# Patient Record
(demographics unavailable — no encounter records)

---

## 2025-01-03 NOTE — ASSESSMENT
[FreeTextEntry1] : xray of finger if pain persisting  no heavy lifting  uri likely viral hold any antibiotic - supportive care - hydrate and rest , start flonase - if not improved or worse start azithromycin in 3-4 days  schedule cpe

## 2025-01-03 NOTE — HISTORY OF PRESENT ILLNESS
[FreeTextEntry1] : *** 01/03/2025  *** JUAN ANTONIO gets seizures once a day, usually in the evening. Starts with left arm/hand starting with sende of tingling followed by stretching out of the hand and may raise the arm.  Will last 8-10 seconds.  Does not feel that he has any impairment, would be able to answer, but would not have full attention - but may be "a little disoriented".  Rarely may feel that right arm is involved. Does not awaken with tongue bite. Longest without medication - may miss morning dose - seizure will come earlier and stronger. Last MRI >3 years ago.  Has had EEGs - including EMU eval - and even when seizure occurs, EEG not showing change. Does not think he's had PET scan.  Has not changed medications for a long time.    PMH - no other medical problems MEds - vitamins  All - NKDA  Social - no tobacco, no alcohol, works as a .   FH - no history of seizures (did genetic test that reported maternal finding) ROS - none   *** 12/28/2022 from Dr. Feng's note ***  This 22-year-old man was seen in consultation today, accompanied by his mother. He has had seizures since age 8, followed at [facility] in the pediatric neurology now seeking to switch to adult neurology.  The only generalized convulsive seizure he had was at age 8.  What he is getting are focal seizures. Describes these as his left hand and arm stretching for a few seconds. Happens about twice a week. There is no alteration of consciousness.  He has tried multiple medications in the past but brings no list or records with him. Currently on Onfi 10/30 mg and Oxtellar 600/1200 mg.  Says this combination has given him the best control. He is quite pleased with the way he is doing.  He graduated "[employer] in music and currently is a  in the schools. He does drive Reportedly prior MRIs have been negative Mother says EEGs have been unrevealing as well His medical history is otherwise unremarkable  Orientation: oriented to person, oriented to place and oriented to time.   Attention: normal concentrating ability.   Language: fluency intact and comprehension intact.   Cranial Nerves: visual acuity intact bilaterally, visual fields full to confrontation, pupils equal round and reactive to light, extraocular motion intact, facial sensation intact symmetrically, face symmetrical, hearing was intact bilaterally, tongue and palate midline, head turning and shoulder shrug symmetric and there was no tongue deviation with protrusion.   Motor: muscle tone was normal in all four extremities; muscle strength was normal in all four extremities and normal bulk in all four extremities.   Motor Strength: no pronator drift on the right. no pronator drift on the left.   Sensory exam: light touch was intact; pain and temperature were intact, and proprioception was intact. Romberg's sign was negative.   Coordination: normal gait. balance was intact. there was no past-pointing. no tremor present. Finger to nose dysmetria was not present. Heel-shin dysmetria was not present.   Deep tendon reflexes: Biceps right 2+. Biceps left 2+.  Triceps right 2+. Triceps left 2+.  Brachioradialis right 2+. Brachioradialis left 2+.  Patella right 2+. Patella left 2+.  Ankle jerk right 2+. Ankle jerk left 2+.   Plantar responses normal on the right, normal on the left.   Eyes: pupils were equal in size, round, reactive to light, with normal accommodation, extraocular movements were intact and full visual field.  *** 5/7/2021 *** Pt seen at [facility] by Dr Gonzalez Lema, prior to this seen at [place]. Sz onset age 8 on rx x 2 yrs, then stopped then first/only GTCS 2 mo later.  Brief focal seizures, left hand tingling, stiffening, tonic extension. x 10sec. 5x/week avg. usually at night when relaxed. no wakening with these as of late. OXC, LEV (no efficacy), now oxtellar 600/1200mg and onfi 10/30mg. Better control for last 4-5years. No ADRs, some fatigue.  Mood, cognition stable. Student at "[employer]", [employer] major. Told prior EEGs normal.  2/2021 MRI/fMRI: report nl. L brain language, motor areas typical 12/2019 PET nl

## 2025-01-03 NOTE — ASSESSMENT
[FreeTextEntry1] : RODRÍGUEZ is a 25yo M with h/o seizures since 9yo.  Seizures characterized by paresthesia in LUE progressing to hand stiffness and arm elevation with mild impairment of awareness.  Relatively stable on combination of Oxtellar 600/1200 and clobazam 10/30, but having frequent seizures - 5+ times per week.   Plan 1. repeat MRI head with epilepsy protocol - will try to have study done on new magnet at [facility] 2. continue current ASM - I spoke to Rodríguez and his mother about trial of cenobamate. He will investigate and we will discuss again at future appointments.  3. f/u visit in 3 mo.   I have spent 60 minutes or longer reviewing patient data or discussing with the patient the cause of seizures or seizure-like events and comorbid conditions, assessing the risk of recurrence, educating the patient or family to recognize seizures, discussing possible treatment options for seizures and comorbid conditions and documenting encounter and plan. More than 50% of time spent counseling and educating patient about epilepsy including safety issues, AED side effects and interactions, alcohol consumption, sleep deprivation, risks and driving privileges associated with the [facility].

## 2025-01-03 NOTE — PHYSICAL EXAM
[No Acute Distress] : no acute distress [Well Nourished] : well nourished [Well Developed] : well developed [Well-Appearing] : well-appearing [Normal Voice/Communication] : normal voice/communication [Normal Sclera/Conjunctiva] : normal sclera/conjunctiva [PERRL] : pupils equal round and reactive to light [Normal Outer Ear/Nose] : the outer ears and nose were normal in appearance [No JVD] : no jugular venous distention [No Lymphadenopathy] : no lymphadenopathy [Supple] : supple [No Respiratory Distress] : no respiratory distress  [No Accessory Muscle Use] : no accessory muscle use [Clear to Auscultation] : lungs were clear to auscultation bilaterally [Normal Rate] : normal rate  [Regular Rhythm] : with a regular rhythm [Normal S1, S2] : normal S1 and S2 [No Murmur] : no murmur heard [No Edema] : there was no peripheral edema [No Extremity Clubbing/Cyanosis] : no extremity clubbing/cyanosis [Soft] : abdomen soft [Non Tender] : non-tender [Non-distended] : non-distended [No Masses] : no abdominal mass palpated [No HSM] : no HSM [Normal Bowel Sounds] : normal bowel sounds [Normal Posterior Cervical Nodes] : no posterior cervical lymphadenopathy [Normal Anterior Cervical Nodes] : no anterior cervical lymphadenopathy [No CVA Tenderness] : no CVA  tenderness [No Spinal Tenderness] : no spinal tenderness [No Joint Swelling] : no joint swelling [Grossly Normal Strength/Tone] : grossly normal strength/tone [No Rash] : no rash [Coordination Grossly Intact] : coordination grossly intact [No Focal Deficits] : no focal deficits [Normal Gait] : normal gait [Speech Grossly Normal] : speech grossly normal [Normal Affect] : the affect was normal [Alert and Oriented x3] : oriented to person, place, and time [Normal Mood] : the mood was normal [Normal Insight/Judgement] : insight and judgment were intact [de-identified] : mild erythema of post pharynx with cobblestoning, no exudate, erythema  of nasal mucosa, TM mild post ear effusion, no erythema of tm [de-identified] : pain with full flexion of 5th digit right hand , nontender , no swelling

## 2025-01-03 NOTE — REVIEW OF SYSTEMS
[Night Sweats] : night sweats [Postnasal Drip] : postnasal drip [Nasal Discharge] : nasal discharge [Cough] : cough [Negative] : Heme/Lymph

## 2025-01-03 NOTE — HISTORY OF PRESENT ILLNESS
[FreeTextEntry8] : right pinky finger injury playing sport  2 weeks ago volleyball injury - , rest , ice , now rated movement 6/10 , now better but extreme flexion and pressure make it worse .  admits to congestion and some night sweats and coughing for past few days  no fever during day , no sore throat ,no ear pain -

## 2025-01-22 NOTE — ASSESSMENT
[FreeTextEntry1] : JUAN ANTONIO is a 25yo M with h/o seizures since 9yo.  Seizures characterized by paresthesia in LUE progressing to hand stiffness and arm elevation with mild impairment of awareness.  Relatively stable on combination of Oxtellar 600/1200 and clobazam 10/30, but having frequent seizures - 5+ times per week.   Plan 1. repeat MRI head with epilepsy protocol - will try to have study done on new magnet at [facility] 2. start cenobamate/xcopri; will ask THADDEUS Castro to f/u with JUAN ANTONIO  3. f/u visit in 3 mo.   I have spent 60 minutes or longer reviewing patient data or discussing with the patient the cause of seizures or seizure-like events and comorbid conditions, assessing the risk of recurrence, educating the patient or family to recognize seizures, discussing possible treatment options for seizures and comorbid conditions and documenting encounter and plan. More than 50% of time spent counseling and educating patient about epilepsy including safety issues, AED side effects and interactions, alcohol consumption, sleep deprivation, risks and driving privileges associated with the [facility].

## 2025-01-22 NOTE — ASSESSMENT
[FreeTextEntry1] : JUAN ANTONIO is a 23yo M with h/o seizures since 7yo.  Seizures characterized by paresthesia in LUE progressing to hand stiffness and arm elevation with mild impairment of awareness.  Relatively stable on combination of Oxtellar 600/1200 and clobazam 10/30, but having frequent seizures - 5+ times per week.   Plan 1. repeat MRI head with epilepsy protocol - will try to have study done on new magnet at [facility] 2. start cenobamate/xcopri; will ask THADDEUS Castro to f/u with JUAN ANTONIO  3. f/u visit in 3 mo.   I have spent 60 minutes or longer reviewing patient data or discussing with the patient the cause of seizures or seizure-like events and comorbid conditions, assessing the risk of recurrence, educating the patient or family to recognize seizures, discussing possible treatment options for seizures and comorbid conditions and documenting encounter and plan. More than 50% of time spent counseling and educating patient about epilepsy including safety issues, AED side effects and interactions, alcohol consumption, sleep deprivation, risks and driving privileges associated with the [facility].

## 2025-01-22 NOTE — HISTORY OF PRESENT ILLNESS
[FreeTextEntry1] : *** 01/16/2025  *** JUAN ANTONIO reports having multiple seizures - seven total - up to 3 in an hour - after last visit - unusual day.  JUAN ANTONIO thinks he was overstressed, but no sleep deprived. However, he made sure to get more sleep. In past seizures, usually happen in the evening when already in bed.  After that day, things have settled down.  He agrees with starting Xcopri.    *** 01/03/2025  *** JUAN ANTONIO gets seizures once a day, usually in the evening. Starts with left arm/hand starting with sende of tingling followed by stretching out of the hand and may raise the arm.  Will last 8-10 seconds.  Does not feel that he has any impairment, would be able to answer, but would not have full attention - but may be "a little disoriented".  Rarely may feel that right arm is involved. Does not awaken with tongue bite. Longest without medication - may miss morning dose - seizure will come earlier and stronger. Last MRI >3 years ago.  Has had EEGs - including EMU eval - and even when seizure occurs, EEG not showing change. Does not think he's had PET scan.  Has not changed medications for a long time.    PMH - no other medical problems MEds - vitamins  All - NKDA  Social - no tobacco, no alcohol, works as a .   FH - no history of seizures (did genetic test that reported maternal finding) ROS - none

## 2025-02-05 NOTE — HEALTH RISK ASSESSMENT
[Good] : ~his/her~ current health as good [No] : In the past 12 months have you used drugs other than those required for medical reasons? No [Little interest or pleasure doing things] : 1) Little interest or pleasure doing things [Feeling down, depressed, or hopeless] : 2) Feeling down, depressed, or hopeless [0] : 2) Feeling down, depressed, or hopeless: Not at all (0) [PHQ-2 Negative - No further assessment needed] : PHQ-2 Negative - No further assessment needed [Never] : Never [None] : None [With Family] : lives with family [Employed] : employed [College] : College [Single] : single [Sexually Active] : sexually active [Feels Safe at Home] : Feels safe at home [Fully functional (bathing, dressing, toileting, transferring, walking, feeding)] : Fully functional (bathing, dressing, toileting, transferring, walking, feeding) [Fully functional (using the telephone, shopping, preparing meals, housekeeping, doing laundry, using] : Fully functional and needs no help or supervision to perform IADLs (using the telephone, shopping, preparing meals, housekeeping, doing laundry, using transportation, managing medications and managing finances) [[id number]] : 0 [Reports changes in hearing] : Reports no changes in hearing [Reports changes in vision] : Reports no changes in vision [Reports changes in dental health] : Reports no changes in dental health

## 2025-02-05 NOTE — PHYSICAL EXAM
[No Acute Distress] : no acute distress [Well Nourished] : well nourished [Well Developed] : well developed [Well-Appearing] : well-appearing [Normal Voice/Communication] : normal voice/communication [Normal Sclera/Conjunctiva] : normal sclera/conjunctiva [PERRL] : pupils equal round and reactive to light [EOMI] : extraocular movements intact [Normal Outer Ear/Nose] : the outer ears and nose were normal in appearance [Normal Oropharynx] : the oropharynx was normal [Normal TMs] : both tympanic membranes were normal [No JVD] : no jugular venous distention [No Lymphadenopathy] : no lymphadenopathy [Supple] : supple [Thyroid Normal, No Nodules] : the thyroid was normal and there were no nodules present [No Respiratory Distress] : no respiratory distress  [No Accessory Muscle Use] : no accessory muscle use [Clear to Auscultation] : lungs were clear to auscultation bilaterally [Normal Rate] : normal rate  [Regular Rhythm] : with a regular rhythm [Normal S1, S2] : normal S1 and S2 [No Murmur] : no murmur heard [No Carotid Bruits] : no carotid bruits [No Abdominal Bruit] : a ~M bruit was not heard ~T in the abdomen [No Varicosities] : no varicosities [Pedal Pulses Present] : the pedal pulses are present [No Edema] : there was no peripheral edema [No Palpable Aorta] : no palpable aorta [No Extremity Clubbing/Cyanosis] : no extremity clubbing/cyanosis [Soft] : abdomen soft [Non Tender] : non-tender [Non-distended] : non-distended [No Masses] : no abdominal mass palpated [No HSM] : no HSM [Normal Bowel Sounds] : normal bowel sounds [Normal Anterior Cervical Nodes] : no anterior cervical lymphadenopathy [Normal Posterior Cervical Nodes] : no posterior cervical lymphadenopathy [No CVA Tenderness] : no CVA  tenderness [No Spinal Tenderness] : no spinal tenderness [No Joint Swelling] : no joint swelling [Grossly Normal Strength/Tone] : grossly normal strength/tone [No Rash] : no rash [Coordination Grossly Intact] : coordination grossly intact [No Focal Deficits] : no focal deficits [Normal Gait] : normal gait [Speech Grossly Normal] : speech grossly normal [Normal Affect] : the affect was normal [Alert and Oriented x3] : oriented to person, place, and time [Normal Mood] : the mood was normal [Normal Insight/Judgement] : insight and judgment were intact

## 2025-02-05 NOTE — HISTORY OF PRESENT ILLNESS
[FreeTextEntry1] : cpe [de-identified] : no chest pain, no sob, no cough, no fever, no dizziness, no abdominal pain, no n/v/d/c/melena/brbpr/hematuria/dysuria

## 2025-02-05 NOTE — HEALTH RISK ASSESSMENT
[Good] : ~his/her~  mood as  good [No] : In the past 12 months have you used drugs other than those required for medical reasons? No [Little interest or pleasure doing things] : 1) Little interest or pleasure doing things [Feeling down, depressed, or hopeless] : 2) Feeling down, depressed, or hopeless [0] : 2) Feeling down, depressed, or hopeless: Not at all (0) [PHQ-2 Negative - No further assessment needed] : PHQ-2 Negative - No further assessment needed [Never] : Never [None] : None [With Family] : lives with family [Employed] : employed [College] : College [Single] : single [Sexually Active] : sexually active [Feels Safe at Home] : Feels safe at home [Fully functional (bathing, dressing, toileting, transferring, walking, feeding)] : Fully functional (bathing, dressing, toileting, transferring, walking, feeding) [Fully functional (using the telephone, shopping, preparing meals, housekeeping, doing laundry, using] : Fully functional and needs no help or supervision to perform IADLs (using the telephone, shopping, preparing meals, housekeeping, doing laundry, using transportation, managing medications and managing finances) [[id number]] : 0 [Reports changes in hearing] : Reports no changes in hearing [Reports changes in vision] : Reports no changes in vision [Reports changes in dental health] : Reports no changes in dental health

## 2025-02-05 NOTE — HISTORY OF PRESENT ILLNESS
[FreeTextEntry1] : cpe [de-identified] : no chest pain, no sob, no cough, no fever, no dizziness, no abdominal pain, no n/v/d/c/melena/brbpr/hematuria/dysuria

## 2025-04-03 NOTE — PHYSICAL EXAM
[General Appearance - Alert] : alert [General Appearance - In No Acute Distress] : in no acute distress [General Appearance - Well Nourished] : well nourished [General Appearance - Well Developed] : well developed [Sclera] : the sclera and conjunctiva were normal [PERRL With Normal Accommodation] : pupils were equal in size, round, and reactive to light [Extraocular Movements] : extraocular movements were intact [Outer Ear] : the ears and nose were normal in appearance [Neck Appearance] : the appearance of the neck was normal [Jugular Venous Distention Increased] : there was no jugular-venous distention [] : no respiratory distress [Respiration, Rhythm And Depth] : normal respiratory rhythm and effort [Exaggerated Use Of Accessory Muscles For Inspiration] : no accessory muscle use [Auscultation Breath Sounds / Voice Sounds] : lungs were clear to auscultation bilaterally [Heart Sounds] : normal S1 and S2 [Arterial Pulses Carotid] : carotid pulses were normal with no bruits [Edema] : there was no peripheral edema [Bowel Sounds] : normal bowel sounds [Abdomen Soft] : soft [Abdomen Tenderness] : non-tender [No CVA Tenderness] : no ~M costovertebral angle tenderness [Abnormal Walk] : normal gait [Skin Color & Pigmentation] : normal skin color and pigmentation [Skin Turgor] : normal skin turgor [No Focal Deficits] : no focal deficits [Oriented To Time, Place, And Person] : oriented to person, place, and time [Impaired Insight] : insight and judgment were intact [Affect] : the affect was normal [Mood] : the mood was normal

## 2025-04-11 NOTE — HISTORY OF PRESENT ILLNESS
[FreeTextEntry1] : *** 04/11/2025  *** JUAN ANTONIO is currently taking cenobamate 200 qHS, clobazam 10/15, Oxtellar  q12.  He reports that he has not had seizures in last week - does not recall last seizure, not from [facility]. Gloria notes last seizures occurred in week prior to starting cenobamate 200mg.  clobazam lowered from 10/20 to 10/15 about a week ago. Mother notes that JUAN ANTONIO is dozing off, yesterday he lost his balance after getting up from dozing. Hyponatremia resolved since lower Oxtellar XR from 1500/d to 1200/d.  MRI reviewed - noted frontal lobe differential volume in 2%-ile, but no obvious asymmetry on visual review.   *** 01/16/2025  *** JUAN ANTONIO reports having multiple seizures - seven total - up to 3 in an hour - after last visit - unusual day.  JUAN ANTONIO thinks he was overstressed, but no sleep deprived. However, he made sure to get more sleep. In past seizures, usually happen in the evening when already in bed.  After that day, things have settled down.  He agrees with starting Xcopri.    *** 01/03/2025  *** JUAN ANTONIO gets seizures once a day, usually in the evening. Starts with left arm/hand starting with sende of tingling followed by stretching out of the hand and may raise the arm.  Will last 8-10 seconds.  Does not feel that he has any impairment, would be able to answer, but would not have full attention - but may be "a little disoriented".  Rarely may feel that right arm is involved. Does not awaken with tongue bite. Longest without medication - may miss morning dose - seizure will come earlier and stronger. Last MRI >3 years ago.  Has had EEGs - including EMU eval - and even when seizure occurs, EEG not showing change. Does not think he's had PET scan.  Has not changed medications for a long time.    PMH - no other medical problems MEds - vitamins  All - NKDA  Social - no tobacco, no alcohol, works as a .   FH - no history of seizures (did genetic test that reported maternal finding) ROS - none

## 2025-04-11 NOTE — ASSESSMENT
[FreeTextEntry1] : JUAN ANTONIO is a 23yo M with h/o seizures since 9yo.  Seizures characterized by paresthesia in LUE progressing to hand stiffness and arm elevation with mild impairment of awareness.  Relatively stable on combination of Oxtellar 600/1200 and clobazam 10/30, but having frequent seizures - 5+ times per week.  Improved on addition of cenobamate - currently taking 200mg x 10 days, but c/o increased sleepiness.   Plan 1. reduce clobazam 20 qHS; continue Oxtellar  q12 for now - we should focus on slowly withdrawing clobazam as this is greater source of side effects. 2. increase cenobamate as needed for seizure control - currently 200mg qHS - will increase 250mg in response to recurrent focal seizure.  3. clinical pharmacist following closely to recommend ASM adjustments in response to AE and seizure recurrence.  3. f/u visit in 3 mo.   I have spent 40 minutes or longer reviewing patient data or discussing with the patient the cause of seizures or seizure-like events and comorbid conditions, assessing the risk of recurrence, educating the patient or family to recognize seizures, discussing possible treatment options for seizures and comorbid conditions and documenting encounter and plan. More than 50% of time spent counseling and educating patient about epilepsy including safety issues, AED side effects and interactions, alcohol consumption, sleep deprivation, risks and driving privileges associated with the [facility].

## 2025-04-21 NOTE — ASSESSMENT
[FreeTextEntry1] : follow up labs  brat diet  benign abdomen  if continued diarrhea recommend stool study

## 2025-04-21 NOTE — HISTORY OF PRESENT ILLNESS
[Parent] : parent [FreeTextEntry8] :  travel to [place], Friday started with diarrhea, that day had coffee and a breakfast sandwich was nauseous couldn't eat it. [employer] and in and out on that trip. vomiting started Saturday - symptoms resolved on Sunday - only now experiencing dizziness and fatigue.  no abdominal, no fever, no solid BM since. no fever, no melena, no brbpr,

## 2025-04-21 NOTE — HISTORY OF PRESENT ILLNESS
[Parent] : parent [FreeTextEntry8] :  travel to [place], Friday started with diarrhea, that day had coffee and a breakfast sandwich was nauseous couldn't eat it. [employer] and in and out on that trip. vomiting started Saturday - symptoms resolved on Sunday - only now experiencing dizziness and fatigue.  no abdominal, no fever, no solid BM since. no fever, no melena, no brbpr,

## 2025-05-13 NOTE — ASSESSMENT
[FreeTextEntry1] : JUAN ANTONIO is a 23yo M with h/o seizures since 7yo.  Seizures characterized by paresthesia in LUE progressing to hand stiffness and arm elevation with mild impairment of awareness.  Relatively stable on combination of Oxtellar 600/1200 and clobazam 10/30, but having frequent seizures - 5+ times per week. Now seizure free on cenobamate 200 qHS, along with clobazam 5 qHS and oxcarbazepine  q12 - but experiencing unacceptable fatigue and sleepiness - now mostly in evening - improvement over prior.   Plan: 1. continue clobazam 5 qHS x 1 week then DC - will ask clinical pharmacist to f/u with patient.  2. continue cenobamate 200 qHS and oxcarbazepine  q12 for now 3. we discussed that there may be benefit in taking cenobamate dose later - 9p or 9:30 to delay onset of sleepiness.  4. f/u visit in 6 wks.    I have spent 40 minutes or longer reviewing patient data or discussing with the patient the cause of seizures or seizure-like events and comorbid conditions, assessing the risk of recurrence, educating the patient or family to recognize seizures, discussing possible treatment options for seizures and comorbid conditions and documenting encounter and plan. More than 50% of time spent counseling and educating patient about epilepsy including safety issues, AED side effects and interactions, alcohol consumption, sleep deprivation, risks and driving privileges associated with the [facility].

## 2025-05-13 NOTE — REASON FOR VISIT
[Mother] : mother [Home] : at home, [unfilled] , at the time of the visit. [Medical Office: ([facility])___] : at the medical office located in  [Telehealth (audio & video)] : This visit was provided via telehealth using real-time 2-way audio visual technology. [Verbal consent obtained from patient] : the patient, [unfilled] [Follow-Up: _____] : a [unfilled] follow-up visit [Parent] : parent

## 2025-05-13 NOTE — HISTORY OF PRESENT ILLNESS
[FreeTextEntry1] : *** 05/13/2025  *** JUAN ANTONIO reports that he is not having focal seizures (used to occur every night as he was falling asleep).  He continues to feel more tired than usual, but this has improved and mostly affects him in the evening.  He has been working with clinical pharmacist to reduce clobazam as cenobamate dose increases. He started clobazam 5mg qHS about a week ago, and notes improvement in fatigue.  Hyponatremia has resolved on most recent labs. JUAN ANTONIO takes medications about 8p and 8a. Extreme tiredness hits about 10:30p - to point he needs assistance getting to bed.  ASM: cenobamate 200 qHS clobazam 5qHS oxcarbazepine  q12  *** 04/11/2025  *** JUAN ANTONIO is currently taking cenobamate 200 qHS, clobazam 10/15, Oxtellar  q12.  He reports that he has not had seizures in last week - does not recall last seizure, not from [facility]. Gloria notes last seizures occurred in week prior to starting cenobamate 200mg.  clobazam lowered from 10/20 to 10/15 about a week ago. Mother notes that JUAN ANTONIO is dozing off, yesterday he lost his balance after getting up from dozing. Hyponatremia resolved since lower Oxtellar XR from 1500/d to 1200/d.  MRI reviewed - noted frontal lobe differential volume in 2%-ile, but no obvious asymmetry on visual review.   *** 01/16/2025  *** JUAN ANTONIO reports having multiple seizures - seven total - up to 3 in an hour - after last visit - unusual day.  JUAN ANTONIO thinks he was overstressed, but no sleep deprived. However, he made sure to get more sleep. In past seizures, usually happen in the evening when already in bed.  After that day, things have settled down.  He agrees with starting Xcopri.    *** 01/03/2025  *** JUAN ANTONIO gets seizures once a day, usually in the evening. Starts with left arm/hand starting with sende of tingling followed by stretching out of the hand and may raise the arm.  Will last 8-10 seconds.  Does not feel that he has any impairment, would be able to answer, but would not have full attention - but may be "a little disoriented".  Rarely may feel that right arm is involved. Does not awaken with tongue bite. Longest without medication - may miss morning dose - seizure will come earlier and stronger. Last MRI >3 years ago.  Has had EEGs - including EMU eval - and even when seizure occurs, EEG not showing change. Does not think he's had PET scan.  Has not changed medications for a long time.    PMH - no other medical problems MEds - vitamins  All - NKDA  Social - no tobacco, no alcohol, works as a .   FH - no history of seizures (did genetic test that reported maternal finding) ROS - none

## 2025-05-13 NOTE — PHYSICAL EXAM
[FreeTextEntry1] : alert and oriented x 3, speech fluent, names easily, follows requests, good recall for recent and remote events. EOM full without sustained nystagmus, PERRL, intact LT V1-V3 bilaterally, face symmetrical, no dysarthria, tongue midline, normal shoulder elevation. Motor - normal motion in upper extremities bilaterally. normal rapid-alternating movements, no drift Sensory - intact LT x 4 ext Coord - no tremor, ataxia Gait - stands without difficulty

## 2025-06-19 NOTE — PHYSICAL EXAM
[No Acute Distress] : no acute distress [Well Nourished] : well nourished [Well Developed] : well developed [Well-Appearing] : well-appearing [Normal Voice/Communication] : normal voice/communication [Normal Sclera/Conjunctiva] : normal sclera/conjunctiva [PERRL] : pupils equal round and reactive to light [EOMI] : extraocular movements intact [Normal Outer Ear/Nose] : the outer ears and nose were normal in appearance [No JVD] : no jugular venous distention [No Respiratory Distress] : no respiratory distress  [No Accessory Muscle Use] : no accessory muscle use [Clear to Auscultation] : lungs were clear to auscultation bilaterally [Normal Rate] : normal rate  [Regular Rhythm] : with a regular rhythm [Normal S1, S2] : normal S1 and S2 [No Murmur] : no murmur heard [No Edema] : there was no peripheral edema [No Nipple Discharge] : no nipple discharge [No Axillary Lymphadenopathy] : no axillary lymphadenopathy [Soft] : abdomen soft [Non Tender] : non-tender [Non-distended] : non-distended [No Masses] : no abdominal mass palpated [No HSM] : no HSM [Normal Bowel Sounds] : normal bowel sounds [No Spinal Tenderness] : no spinal tenderness [No Joint Swelling] : no joint swelling [Grossly Normal Strength/Tone] : grossly normal strength/tone [No Rash] : no rash [Coordination Grossly Intact] : coordination grossly intact [No Focal Deficits] : no focal deficits [Normal Gait] : normal gait [Speech Grossly Normal] : speech grossly normal [Normal Affect] : the affect was normal [Alert and Oriented x3] : oriented to person, place, and time [Normal Mood] : the mood was normal [Normal Insight/Judgement] : insight and judgment were intact [de-identified] : lump posterior to nipple bilateral - tender to palpation

## 2025-06-19 NOTE — PHYSICAL EXAM
[No Acute Distress] : no acute distress [Well Nourished] : well nourished [Well Developed] : well developed [Well-Appearing] : well-appearing [Normal Voice/Communication] : normal voice/communication [Normal Sclera/Conjunctiva] : normal sclera/conjunctiva [PERRL] : pupils equal round and reactive to light [EOMI] : extraocular movements intact [Normal Outer Ear/Nose] : the outer ears and nose were normal in appearance [No JVD] : no jugular venous distention [No Respiratory Distress] : no respiratory distress  [No Accessory Muscle Use] : no accessory muscle use [Clear to Auscultation] : lungs were clear to auscultation bilaterally [Normal Rate] : normal rate  [Regular Rhythm] : with a regular rhythm [Normal S1, S2] : normal S1 and S2 [No Murmur] : no murmur heard [No Edema] : there was no peripheral edema [No Nipple Discharge] : no nipple discharge [No Axillary Lymphadenopathy] : no axillary lymphadenopathy [Soft] : abdomen soft [Non Tender] : non-tender [Non-distended] : non-distended [No Masses] : no abdominal mass palpated [No HSM] : no HSM [Normal Bowel Sounds] : normal bowel sounds [No Spinal Tenderness] : no spinal tenderness [No Joint Swelling] : no joint swelling [Grossly Normal Strength/Tone] : grossly normal strength/tone [No Rash] : no rash [Coordination Grossly Intact] : coordination grossly intact [No Focal Deficits] : no focal deficits [Normal Gait] : normal gait [Speech Grossly Normal] : speech grossly normal [Normal Affect] : the affect was normal [Alert and Oriented x3] : oriented to person, place, and time [Normal Mood] : the mood was normal [Normal Insight/Judgement] : insight and judgment were intact [de-identified] : lump posterior to nipple bilateral - tender to palpation

## 2025-06-19 NOTE — HISTORY OF PRESENT ILLNESS
[FreeTextEntry8] : Lump on chest  admits to noticed with lifting was getting breast and chest tenderness - states area feels swollen and tender -

## 2025-07-07 NOTE — ASSESSMENT
[FreeTextEntry1] : JUAN ANTONIO is a 25yo M with h/o seizures since 9yo.  Seizures characterized by paresthesia in LUE progressing to hand stiffness and arm elevation with mild impairment of awareness.  Relatively stable on combination of Oxtellar 300 q12 and Xcopri, but recently had 5 seizures in past week - close to baseline seizure frequency. Seizure described as beginning with sensory aura of paresthesias.   Plan 1. MRI re-reviewed - normal study - if seizures recur, will request PET to determine is focal abnormality in the right parietal region.  2. increase cenobamate 225 qHS; continue oxcarbazepine  q12  3. f/u visit in 3 mo.   I have spent 30 minutes or longer reviewing patient data or discussing with the patient the cause of seizures or seizure-like events and comorbid conditions, assessing the risk of recurrence, educating the patient or family to recognize seizures, discussing possible treatment options for seizures and comorbid conditions and documenting encounter and plan. More than 50% of time spent counseling and educating patient about epilepsy including safety issues, AED side effects and interactions, alcohol consumption, sleep deprivation, risks and driving privileges associated with the [facility].

## 2025-07-07 NOTE — HISTORY OF PRESENT ILLNESS
[FreeTextEntry1] : *** 07/07/2025  *** JUAN ANTONIO endorses that he is no longer taking clobazam and no longer feels so tired - his energy is back to normal.  However, for the first time since starting Xcopri, he had 5 brief seizures in the past week, 3 on the first night, 1 more on each of 2 subsequent nights. He denies any provoking factor.  He continues taking Oxtellar  q12, and is no longer taking clobazam.  He has noticed about 20lb weight loss since starting XCopri and some skin complaints, and is asking whether these can be related to cenobamate.   *** 05/13/2025  *** JUAN ANTONIO reports that he is not having focal seizures (used to occur every night as he was falling asleep).  He continues to feel more tired than usual, but this has improved and mostly affects him in the evening.  He has been working with clinical pharmacist to reduce clobazam as cenobamate dose increases. He started clobazam 5mg qHS about a week ago, and notes improvement in fatigue.  Hyponatremia has resolved on most recent labs. JUAN ANTONIO takes medications about 8p and 8a. Extreme tiredness hits about 10:30p - to point he needs assistance getting to bed.  ASM: cenobamate 200 qHS clobazam 5qHS oxcarbazepine  q12  *** 04/11/2025  *** JUAN ANTONIO is currently taking cenobamate 200 qHS, clobazam 10/15, Oxtellar  q12.  He reports that he has not had seizures in last week - does not recall last seizure, not from [facility] notes last seizures occurred in week prior to starting cenobamate 200mg.  clobazam lowered from 10/20 to 10/15 about a week ago. Mother notes that JUAN ANTONIO is dozing off, yesterday he lost his balance after getting up from dozing. Hyponatremia resolved since lower Oxtellar XR from 1500/d to 1200/d.  MRI reviewed - noted frontal lobe differential volume in 2%-ile, but no obvious asymmetry on visual review.   *** 01/16/2025  *** JUAN ANTONIO reports having multiple seizures - seven total - up to 3 in an hour - after last visit - unusual day.  JUAN ANTONIO thinks he was overstressed, but no sleep deprived. However, he made sure to get more sleep. In past seizures, usually happen in the evening when already in bed.  After that day, things have settled down.  He agrees with starting Xcopri.    *** 01/03/2025  *** JUAN ANTONIO gets seizures once a day, usually in the evening. Starts with left arm/hand starting with sende of tingling followed by stretching out of the hand and may raise the arm.  Will last 8-10 seconds.  Does not feel that he has any impairment, would be able to answer, but would not have full attention - but may be "a little disoriented".  Rarely may feel that right arm is involved. Does not awaken with tongue bite. Longest without medication - may miss morning dose - seizure will come earlier and stronger. Last MRI >3 years ago.  Has had EEGs - including EMU eval - and even when seizure occurs, EEG not showing change. Does not think he's had PET scan.  Has not changed medications for a long time.    PMH - no other medical problems MEds - vitamins  All - NKDA  Social - no tobacco, no alcohol, works as a .   FH - no history of seizures (did genetic test that reported maternal finding) ROS - none